# Patient Record
Sex: MALE | Race: WHITE | Employment: OTHER | URBAN - METROPOLITAN AREA
[De-identification: names, ages, dates, MRNs, and addresses within clinical notes are randomized per-mention and may not be internally consistent; named-entity substitution may affect disease eponyms.]

---

## 2022-07-06 ENCOUNTER — INITIAL CONSULT (OUTPATIENT)
Dept: CARDIOLOGY CLINIC | Age: 52
End: 2022-07-06
Payer: COMMERCIAL

## 2022-07-06 VITALS
DIASTOLIC BLOOD PRESSURE: 82 MMHG | WEIGHT: 179.2 LBS | BODY MASS INDEX: 25.65 KG/M2 | SYSTOLIC BLOOD PRESSURE: 124 MMHG | HEART RATE: 79 BPM | HEIGHT: 70 IN

## 2022-07-06 DIAGNOSIS — Z82.49 FAMILY HISTORY OF CORONARY ARTERY DISEASE: ICD-10-CM

## 2022-07-06 DIAGNOSIS — E78.2 MIXED HYPERLIPIDEMIA: ICD-10-CM

## 2022-07-06 DIAGNOSIS — Z86.16 HISTORY OF 2019 NOVEL CORONAVIRUS DISEASE (COVID-19): ICD-10-CM

## 2022-07-06 DIAGNOSIS — R06.09 DYSPNEA ON EXERTION: Primary | ICD-10-CM

## 2022-07-06 PROCEDURE — 99204 OFFICE O/P NEW MOD 45 MIN: CPT | Performed by: INTERNAL MEDICINE

## 2022-07-06 PROCEDURE — 93000 ELECTROCARDIOGRAM COMPLETE: CPT | Performed by: INTERNAL MEDICINE

## 2022-07-06 RX ORDER — FLUTICASONE PROPIONATE 50 MCG
1 SPRAY, SUSPENSION (ML) NASAL DAILY
COMMUNITY

## 2022-07-06 RX ORDER — CETIRIZINE HYDROCHLORIDE 10 MG/1
10 TABLET ORAL DAILY
COMMUNITY

## 2022-07-06 ASSESSMENT — ENCOUNTER SYMPTOMS
ALLERGIC/IMMUNOLOGIC NEGATIVE: 1
SHORTNESS OF BREATH: 1
ORTHOPNEA: 0
EYES NEGATIVE: 1
GASTROINTESTINAL NEGATIVE: 1

## 2022-08-22 DIAGNOSIS — E78.2 MIXED HYPERLIPIDEMIA: ICD-10-CM

## 2022-08-22 LAB
ALBUMIN SERPL-MCNC: 3.8 G/DL (ref 3.5–5)
ALBUMIN/GLOB SERPL: 1.3 {RATIO} (ref 1.2–3.5)
ALP SERPL-CCNC: 47 U/L (ref 50–136)
ALT SERPL-CCNC: 29 U/L (ref 12–65)
ANION GAP SERPL CALC-SCNC: 1 MMOL/L (ref 7–16)
AST SERPL-CCNC: 20 U/L (ref 15–37)
BILIRUB SERPL-MCNC: 0.2 MG/DL (ref 0.2–1.1)
BUN SERPL-MCNC: 24 MG/DL (ref 6–23)
CALCIUM SERPL-MCNC: 9.6 MG/DL (ref 8.3–10.4)
CHLORIDE SERPL-SCNC: 112 MMOL/L (ref 98–107)
CHOLEST SERPL-MCNC: 237 MG/DL
CO2 SERPL-SCNC: 24 MMOL/L (ref 21–32)
CREAT SERPL-MCNC: 1.1 MG/DL (ref 0.8–1.5)
GLOBULIN SER CALC-MCNC: 2.9 G/DL (ref 2.3–3.5)
GLUCOSE SERPL-MCNC: 102 MG/DL (ref 65–100)
HDLC SERPL-MCNC: 50 MG/DL (ref 40–60)
HDLC SERPL: 4.7 {RATIO}
LDLC SERPL CALC-MCNC: 147.8 MG/DL
POTASSIUM SERPL-SCNC: 4 MMOL/L (ref 3.5–5.1)
PROT SERPL-MCNC: 6.7 G/DL (ref 6.3–8.2)
SODIUM SERPL-SCNC: 137 MMOL/L (ref 136–145)
TRIGL SERPL-MCNC: 196 MG/DL (ref 35–150)
VLDLC SERPL CALC-MCNC: 39.2 MG/DL (ref 6–23)

## 2022-08-23 LAB — LPA SERPL-SCNC: 99.5 NMOL/L

## 2022-08-28 NOTE — PROGRESS NOTES
[facility] CARDIOLOGY Follow Up                 Reason for Visit: Follow-up results    Subjective:     Patient is a 46 y.o. male with a PMH of hyperlipidemia who presents for follow-up. The patient was last seen by Dr. Arianne Yang on July 6. An IDALIA was ordered for QUESADA. The patient had an IDALIA on August 22 that was noted to demonstrate a normal EF. He had an above average exercise capacity with no angina during the test.  The study was negative for echocardiographic evidence of ischemia. No past medical history on file. No past surgical history on file. No family history on file. Social History     Tobacco Use    Smoking status: Never    Smokeless tobacco: Never   Substance Use Topics    Alcohol use: Not on file      No Known Allergies      ROS:  No obvious pertinent positives on review of systems except for what was outlined above.        Objective:       /72   Pulse 72   Ht 5' 10\" (1.778 m)   Wt 182 lb 1.6 oz (82.6 kg)   BMI 26.13 kg/m²     BP Readings from Last 3 Encounters:   08/29/22 128/72   08/22/22 126/80   07/06/22 124/82       Wt Readings from Last 3 Encounters:   08/29/22 182 lb 1.6 oz (82.6 kg)   08/22/22 179 lb (81.2 kg)   07/06/22 179 lb 3.2 oz (81.3 kg)       General/Constitutional:   Alert and oriented x 3, no acute distress  HEENT:   normocephalic, atraumatic, moist mucous membranes  Neck:   No JVD or carotid bruits bilaterally  Cardiovascular:   regular rate and rhythm, no rub/gallop appreciated  Pulmonary:   clear to auscultation bilaterally, no respiratory distress  Abdomen:   soft, non-tender, non-distended  Ext:   No sig LE edema bilaterally  Skin:  warm and dry, no obvious rashes seen  Neuro:   no obvious sensory or motor deficits  Psychiatric:   normal mood and affect    Data Review:   Lab Results   Component Value Date    CHOL 237 (H) 08/22/2022     Lab Results   Component Value Date    TRIG 196 (H) 08/22/2022     Lab Results   Component Value Date    HDL 50 08/22/2022     Lab Results   Component Value Date    LDLCALC 147.8 (H) 08/22/2022     Lab Results   Component Value Date    LABVLDL 39.2 (H) 08/22/2022     Lab Results   Component Value Date    CHOLHDLRATIO 4.7 08/22/2022        Lab Results   Component Value Date/Time     08/22/2022 02:53 PM    K 4.0 08/22/2022 02:53 PM     08/22/2022 02:53 PM    CO2 24 08/22/2022 02:53 PM    BUN 24 08/22/2022 02:53 PM    CREATININE 1.10 08/22/2022 02:53 PM    GLUCOSE 102 08/22/2022 02:53 PM    CALCIUM 9.6 08/22/2022 02:53 PM         Lab Results   Component Value Date    ALT 29 08/22/2022    AST 20 08/22/2022        Assessment/Plan:   1. Hyperlipidemia, unspecified hyperlipidemia type  - Discussed with patient he has an indication for statin therapy with a history of LDL >190 in June 2021  - LDL has decreased significantly with lifestyle modification alone  - Defer to primary cardiologist     2. Overweight (BMI 25.0-29.9)  - Educated on Mediterranean diet and exercise    3.  History of stress test  - Discussed with patient he had no angina during the stress test and had above average exercise capacity with no echocardiographic evidence of myocardial ischemia    F/U: 6 months with Dr. Lobito Noguera MD

## 2022-08-29 ENCOUNTER — OFFICE VISIT (OUTPATIENT)
Dept: CARDIOLOGY CLINIC | Age: 52
End: 2022-08-29
Payer: COMMERCIAL

## 2022-08-29 VITALS
HEIGHT: 70 IN | WEIGHT: 182.1 LBS | SYSTOLIC BLOOD PRESSURE: 128 MMHG | DIASTOLIC BLOOD PRESSURE: 72 MMHG | HEART RATE: 72 BPM | BODY MASS INDEX: 26.07 KG/M2

## 2022-08-29 DIAGNOSIS — Z92.89 HISTORY OF STRESS TEST: ICD-10-CM

## 2022-08-29 DIAGNOSIS — E78.5 HYPERLIPIDEMIA, UNSPECIFIED HYPERLIPIDEMIA TYPE: Primary | ICD-10-CM

## 2022-08-29 DIAGNOSIS — E66.3 OVERWEIGHT (BMI 25.0-29.9): ICD-10-CM

## 2022-08-29 PROCEDURE — 99204 OFFICE O/P NEW MOD 45 MIN: CPT | Performed by: INTERNAL MEDICINE

## 2022-09-02 ENCOUNTER — TELEPHONE (OUTPATIENT)
Dept: CARDIOLOGY CLINIC | Age: 52
End: 2022-09-02

## 2022-09-02 NOTE — TELEPHONE ENCOUNTER
----- Message from Sanaz Rothman DO sent at 8/24/2022  5:48 PM EDT -----  Please call the patient regarding their results. Normal heart function on your recent stress test.     Cholesterol numbers remain elevated on lab test, but are improved from prior testing about a year ago. Continue to stay active with exercising and recommend dietary changes in an effort to decrease fat intake in your diet. Recommend the previously ordered coronary calcium score CT scan.  Please let us know if there is any problems getting this test.

## 2023-03-01 NOTE — PROGRESS NOTES
[facility], [address] [place] [address]  Patient:  Kathy Pruitt  1970       SUBJECTIVE:  Kathy Pruitt is a  46 y.o. male seen for a visit regarding the following:     Chief Complaint   Patient presents with    Consultation     establish care     CC: HLD, dyspnea on exertion     HPI:   46 y.o. male  with a history of HLD , COVID, seasonal allergies who is here for evaluation for risk factor modification as well as dyspnea on exertion. Patient reports that he has known about elevated cholesterol for about 1 year. Labs were checked with his PCP in June 2021 and found to have elevated LDL greater than 200. States that he is always been an active healthy person tries to modify his diet as well as limit the amount of medications that he takes. Patient reports that he exercise 5-6 times per week. Weight lifting,  elliptical, and feels well when he does this. However, reports that he has Noticed that his endurance is less than it was in past.  He has been having dyspnea with activity such as going up the stairs. This has been occurring at a level activity that would not bring on dyspnea in the past.  No other associated symptoms. Denies chest pain, chest pressure, irregular heartbeats, syncope, near syncope, leg swelling, difficulty moving his arms or legs. Symptoms are mild to moderate in nature. Better with rest.  No other aggravating or alleviating factors identified. No other associated symptoms. Family History:   Uncle - CAD s/p CABG  Father - HLD    Cardiovascular Testing:  - none     Past medical history, past surgical history, family history, social history, and medications were all reviewed with the patient today and updated as necessary. There are no problems to display for this patient. No family history on file.     Social History     Tobacco Use    Smoking status: Never Smoker    Smokeless tobacco: Never Used   Substance Use Topics    Alcohol use: Not on file       Review of Systems   Constitutional: Positive for malaise/fatigue. HENT: Negative. Eyes: Negative. Cardiovascular: Positive for dyspnea on exertion. Negative for chest pain, leg swelling, near-syncope, orthopnea, palpitations, paroxysmal nocturnal dyspnea and syncope. Respiratory: Positive for shortness of breath. Endocrine: Negative. Hematologic/Lymphatic: Negative. Skin: Negative. Musculoskeletal: Negative. Gastrointestinal: Negative. Genitourinary: Negative. Neurological: Negative. Negative for dizziness and light-headedness. Psychiatric/Behavioral: Negative. Allergic/Immunologic: Negative. All other systems reviewed and are negative. PHYSICAL EXAM:  /82   Pulse 79   Ht 5' 10\" (1.778 m)   Wt 179 lb 3.2 oz (81.3 kg)   BMI 25.71 kg/m²     Physical Exam  Vitals reviewed. Constitutional:       General: He is not in acute distress. Appearance: Normal appearance. He is normal weight. He is not ill-appearing, toxic-appearing or diaphoretic. HENT:      Head: Normocephalic and atraumatic. Right Ear: External ear normal.      Left Ear: External ear normal.      Nose: Nose normal.   Eyes:      General: No scleral icterus. Right eye: No discharge. Left eye: No discharge. Neck:      Vascular: No carotid bruit (bilaterally). Cardiovascular:      Rate and Rhythm: Normal rate and regular rhythm. Heart sounds: Normal heart sounds. No murmur heard. No friction rub. No gallop. Pulmonary:      Effort: Pulmonary effort is normal. No respiratory distress. Breath sounds: Normal breath sounds. No stridor. No wheezing or rales. Abdominal:      General: Abdomen is flat. There is no distension. Palpations: Abdomen is soft. Tenderness: There is no abdominal tenderness. There is no guarding. Musculoskeletal:         General: Normal range of motion. Cervical back: Normal range of motion. Right lower leg: No edema. Left lower leg: No edema. Skin:     General: Skin is warm and dry. Coloration: Skin is not jaundiced or pale. Findings: No bruising or lesion. Neurological:      Mental Status: He is alert and oriented to person, place, and time. Psychiatric:         Mood and Affect: Mood and affect normal.         Speech: Speech normal.         Behavior: Behavior normal.         Thought Content: Thought content normal.         Cognition and Memory: Cognition normal. Cognition is not impaired. Memory is not impaired. He does not exhibit impaired recent memory or impaired remote memory. Judgment: Judgment normal.     Medical problems, medical history, and test results were reviewed with the patient today. No results found for this or any previous visit (from the past 168 hour(s)). Current Outpatient Medications:     Multiple Vitamins-Minerals (MULTIVITAMIN MEN 50+) TABS, Take by mouth, Disp: , Rfl:     cetirizine (ZYRTEC) 10 MG tablet, Take 10 mg by mouth daily, Disp: , Rfl:     fluticasone (FLONASE) 50 MCG/ACT nasal spray, 1 spray by Nasal route daily, Disp: , Rfl:     ASSESSMENT/PLAN:    1. Dyspnea on exertion  - patient with risk factors for CAD including HLD, family history  - at this time feel that the patient is low/intermediate risk for ischemic dyspnea with exertion  - continue risk factor modification  - warrants investigation with stress echo  - instructed patient go to ER or call 911/EMS should symptoms recur or worsen   - Stress echocardiogram (TTE) exercise with contrast, bubble, strain, and 3D PRN; Future    2. Mixed hyperlipidemia  3. Family history of coronary artery disease  - Check coronary artery calcium score, fasting CMP/lipid/lipoprotein a.  - EKG personally reviewed in office today demonstrates Sinus  Rhythm   RATE 79 BPM , WITHIN NORMAL LIMITS.        Problem List Items Addressed This Visit     None      Visit Diagnoses     Dyspnea on exertion Qbrexza Pregnancy And Lactation Text: There is no available data on Qbrexza use in pregnant women.  There is no available data on Qbrexza use in lactation.

## 2023-07-05 ENCOUNTER — PATIENT MESSAGE (OUTPATIENT)
Age: 53
End: 2023-07-05

## 2023-08-08 ENCOUNTER — OFFICE VISIT (OUTPATIENT)
Age: 53
End: 2023-08-08
Payer: COMMERCIAL

## 2023-08-08 VITALS
SYSTOLIC BLOOD PRESSURE: 126 MMHG | DIASTOLIC BLOOD PRESSURE: 84 MMHG | WEIGHT: 173 LBS | BODY MASS INDEX: 24.77 KG/M2 | HEIGHT: 70 IN | HEART RATE: 70 BPM

## 2023-08-08 DIAGNOSIS — E78.41 ELEVATED LIPOPROTEIN A LEVEL: ICD-10-CM

## 2023-08-08 DIAGNOSIS — E78.2 MIXED HYPERLIPIDEMIA: Primary | ICD-10-CM

## 2023-08-08 PROCEDURE — 93000 ELECTROCARDIOGRAM COMPLETE: CPT | Performed by: INTERNAL MEDICINE

## 2023-08-08 PROCEDURE — 99214 OFFICE O/P EST MOD 30 MIN: CPT | Performed by: INTERNAL MEDICINE

## 2023-08-08 ASSESSMENT — ENCOUNTER SYMPTOMS
COUGH: 0
RESPIRATORY NEGATIVE: 1
SHORTNESS OF BREATH: 0

## 2023-08-08 NOTE — PROGRESS NOTES
[address]      Patient:  Severo Catchings  1970       SUBJECTIVE:  Severo Catchings is a  46 y.o. male seen for a follow up visit regarding the following:     Chief Complaint   Patient presents with    Follow-up     Late 6 months    Hyperlipidemia   . CC: HLD, dyspnea on exertion      HPI:   46 y.o. male  with a history of HLD , COVID, seasonal allergies who is here for follow up. Patient was last seen in office on 7/9/22 , since then reports that he has been doing well. Patient reports that he has been doing fairly well. Has been trying to cut out red meat in his diet. Stays active. Exercises regularly. Has recent lipids check with PCP on 6/28/23. No chest pain chest pressure, shortness of breath with exertion or leg swelling. Able to do his exercise without limitations. Recently stopped taking allergy medication. Cardiovascular Testing:  - Echo 8/22/2022: 10-minute 22 seconds exercise, EKG negative for ischemia, no echocardiographic evidence of ischemia. Low risk. Past medical history, past surgical history, family history, social history, and medications were all reviewed with the patient today and updated as necessary. Patient Active Problem List    Diagnosis Date Noted    Hyperlipidemia 08/29/2022     Priority: Medium    Overweight (BMI 25.0-29.9) 08/29/2022     Priority: Medium    History of stress test 08/29/2022     Priority: Medium       No family history on file. Social History     Tobacco Use    Smoking status: Never    Smokeless tobacco: Never   Substance Use Topics    Alcohol use: Not on file       Review of Systems   Constitutional: Negative. Cardiovascular: Negative. Negative for chest pain, dyspnea on exertion and leg swelling. Respiratory: Negative. Negative for cough and shortness of breath. Neurological: Negative.       PHYSICAL EXAM:  /84   Pulse 70   Ht 5' 10\" (1.778 m)   Wt 173 lb

## 2023-08-24 ENCOUNTER — TELEPHONE (OUTPATIENT)
Age: 53
End: 2023-08-24

## 2023-08-24 ENCOUNTER — HOSPITAL ENCOUNTER (OUTPATIENT)
Dept: CT IMAGING | Age: 53
Discharge: HOME | End: 2023-08-27
Attending: INTERNAL MEDICINE

## 2023-08-24 DIAGNOSIS — E78.2 MIXED HYPERLIPIDEMIA: ICD-10-CM

## 2023-08-24 DIAGNOSIS — E78.41 ELEVATED LIPOPROTEIN A LEVEL: ICD-10-CM

## 2023-08-24 NOTE — TELEPHONE ENCOUNTER
----- Message from David Woo DO sent at 8/24/2023  1:25 PM EDT -----  Please call the patient regarding their result. Coronary artery calcium score shows mild calcification of the coronary arteries. Again recommend cholesterol lowering medicine, please have the patient let us know if he is interested. Thank you.

## 2023-08-24 NOTE — RESULT ENCOUNTER NOTE
Please call the patient regarding their result. Coronary artery calcium score shows mild calcification of the coronary arteries. Again recommend cholesterol lowering medicine, please have the patient let us know if he is interested. Thank you.

## 2023-08-25 NOTE — TELEPHONE ENCOUNTER
Informed patient of results. He wants to wait and think about starting medication and will give us a call if he decides to start one.